# Patient Record
Sex: FEMALE | Race: WHITE | NOT HISPANIC OR LATINO | Employment: FULL TIME | URBAN - METROPOLITAN AREA
[De-identification: names, ages, dates, MRNs, and addresses within clinical notes are randomized per-mention and may not be internally consistent; named-entity substitution may affect disease eponyms.]

---

## 2017-02-21 ENCOUNTER — APPOINTMENT (OUTPATIENT)
Dept: INTERNAL MEDICINE | Facility: MEDICAL CENTER | Age: 52
End: 2017-02-21
Payer: COMMERCIAL

## 2017-03-01 ENCOUNTER — OFFICE VISIT (OUTPATIENT)
Dept: INTERNAL MEDICINE | Facility: MEDICAL CENTER | Age: 52
End: 2017-03-01
Payer: COMMERCIAL

## 2017-03-01 VITALS
HEIGHT: 66 IN | DIASTOLIC BLOOD PRESSURE: 80 MMHG | WEIGHT: 157.6 LBS | TEMPERATURE: 98.4 F | HEART RATE: 95 BPM | OXYGEN SATURATION: 94 % | SYSTOLIC BLOOD PRESSURE: 115 MMHG | BODY MASS INDEX: 25.33 KG/M2

## 2017-03-01 DIAGNOSIS — B00.1 COLD SORE: ICD-10-CM

## 2017-03-01 DIAGNOSIS — E05.90 HYPERTHYROIDISM: ICD-10-CM

## 2017-03-01 DIAGNOSIS — I87.2 CHRONIC VENOUS INSUFFICIENCY: ICD-10-CM

## 2017-03-01 DIAGNOSIS — Z12.39 SCREENING BREAST EXAMINATION: ICD-10-CM

## 2017-03-01 DIAGNOSIS — E55.9 VITAMIN D DEFICIENCY: ICD-10-CM

## 2017-03-01 DIAGNOSIS — H01.116: ICD-10-CM

## 2017-03-01 DIAGNOSIS — E78.5 DYSLIPIDEMIA: ICD-10-CM

## 2017-03-01 PROCEDURE — 99396 PREV VISIT EST AGE 40-64: CPT | Performed by: INTERNAL MEDICINE

## 2017-03-01 RX ORDER — VALACYCLOVIR HYDROCHLORIDE 1 G/1
1000 TABLET, FILM COATED ORAL 3 TIMES DAILY
Qty: 36 TAB | Refills: 2 | Status: SHIPPED | OUTPATIENT
Start: 2017-03-01 | End: 2018-02-14 | Stop reason: SDUPTHER

## 2017-03-01 ASSESSMENT — PATIENT HEALTH QUESTIONNAIRE - PHQ9: CLINICAL INTERPRETATION OF PHQ2 SCORE: 0

## 2017-03-01 NOTE — MR AVS SNAPSHOT
"        Lacey Hamlin   3/1/2017 3:20 PM   Office Visit   MRN: [id number]    Department:  [facility] Med - Internal Med   Dept Phone:  [phone]    Description:  Female : 1965   Provider:  Florentin Hebert M.D.           Reason for Visit     Annual Exam physical exam    Medication Refill valtrex      Allergies as of 3/1/2017     No Known Allergies      You were diagnosed with     Vitamin D deficiency   [9515968]       Hyperthyroidism   [768680]       Dyslipidemia   [664223]       Chronic venous insufficiency   [194057]       Screening breast examination   [963864]         Vital Signs     Blood Pressure Pulse Temperature Height Weight Body Mass Index    115/80 mmHg 95 36.9 °C (98.4 °F) 1.664 m (5' 5.51\") 71.487 kg (157 lb 9.6 oz) 25.82 kg/m2    Oxygen Saturation Smoking Status                94% Former Smoker          Basic Information     Date Of Birth Sex Race Ethnicity Preferred Language    1965 Female White Non- English      Your appointments     Sep 07, 2017 10:00 AM   Established Patient with Florentin Hebert M.D.   [facility] Med - Internal Medicine (--)    [address] [place] [address] [id number] [phone]           You will be receiving a confirmation call a few days before your appointment from our automated call confirmation system.              Problem List              ICD-10-CM Priority Class Noted - Resolved    Cold J00   2016 - Present    Chronic venous insufficiency I87.2   2016 - Present    Dyslipidemia E78.5   2016 - Present    Vitamin D deficiency E55.9   2016 - Present    Hot flashes R23.2   2016 - Present    Cold sore B00.1   2016 - Present    Onychomycosis B35.1   2016 - Present    Menopausal vasomotor syndrome N95.1   2016 - Present    Hyperthyroidism E05.90   2016 - Present    Right thyroid nodule E04.1   2016 - Present      Health Maintenance        Date Due Completion Dates   " IMM DTaP/Tdap/Td Vaccine (1 - Tdap) 3/4/1984 ---    PAP SMEAR 3/4/1986 ---    MAMMOGRAM 3/4/2005 ---    COLONOSCOPY 3/4/2015 ---    IMM INFLUENZA (1) 9/1/2016 ---            Current Immunizations     No immunizations on file.      Below and/or attached are the medications your provider expects you to take. Review all of your home medications and newly ordered medications with your provider and/or pharmacist. Follow medication instructions as directed by your provider and/or pharmacist. Please keep your medication list with you and share with your provider. Update the information when medications are discontinued, doses are changed, or new medications (including over-the-counter products) are added; and carry medication information at all times in the event of emergency situations     Allergies:  No Known Allergies          Medications  Valid as of: March 01, 2017 -  4:08 PM    Generic Name Brand Name Tablet Size Instructions for use    Conj Estrog-Medroxyprogest Ace (Tab) PREMPRO 0.45-1.5 MG Take 1 Tab by mouth every day.        ValACYclovir HCl (Tab) VALTREX 1 GM Take 1 Tab by mouth 3 times a day.        Venlafaxine HCl (Tab) EFFEXOR 37.5 MG Take 1 Tab by mouth 1 time daily as needed.        .                 Medicines prescribed today were sent to:     [facility] #[id number] - [place] - [address] [address]    Phone: [phone] Fax: [phone]    Open 24 Hours?: No      Medication refill instructions:       If your prescription bottle indicates you have medication refills left, it is not necessary to call your provider’s office. Please contact your pharmacy and they will refill your medication.    If your prescription bottle indicates you do not have any refills left, you may request refills at any time through one of the following ways: The online avocadostore system (except Urgent Care), by calling your provider’s office, or by asking your pharmacy to contact your provider’s office with a  refill request. Medication refills are processed only during regular business hours and may not be available until the next business day. Your provider may request additional information or to have a follow-up visit with you prior to refilling your medication.   *Please Note: Medication refills are assigned a new Rx number when refilled electronically. Your pharmacy may indicate that no refills were authorized even though a new prescription for the same medication is available at the pharmacy. Please request the medicine by name with the pharmacy before contacting your provider for a refill.        Your To Do List     Future Labs/Procedures Complete By Expires    TSH WITH REFLEX TO FT4  As directed 3/1/2018    VITAMIN D,25 HYDROXY  As directed 3/2/2018      Instructions    Get labs drawn  Stop calcium          MyChart Access Code: Activation code not generated  Current [employer] Status: Active

## 2017-03-02 NOTE — PROGRESS NOTES
Established Patient    Ms. Don Mosquera a 51 y.o. female who presents today with:  CC: Annual Exam and Medication Refill        Assessment and Plan    1. Cold sore  I suggested that she increase her Valtrex from 2 pills a day to 3 pills a day. Currently she only has 4 outbreaks per year and she is happy with taking when necessary Valtrex which is appropriate. I did inform her that there currently is a vaccine being  researched for herpes simplex one infections and that may be available in the near future    2. Vitamin D deficiency  She can continue with vitamin D supplementation although she has a low risk for osteoporosis. I will check a vitamin D level. I suggest that she discontinue calcium supplementation at this point due to the increased cardiovascular risk in women who take this for supplementation purposes only  - VITAMIN D,25 HYDROXY; Future    3. Hyperthyroidism  She currently does not have signs or symptoms of hypothyroidism or hyperthyroidism. Her most recent TSH was normal I will check it again.   - TSH WITH REFLEX TO FT4; Future    4. Dyslipidemia  I will repeat her lipid panel. I encouraged her to continue with her healthy lifestyle habits to decrease her overall risk for diabetes, hypertension and cardiovascular disease  - LIPID PANEL    5. Chronic venous insufficiency  Continue with compression hoses as directed. Currently they are working very well to manage her venous insufficiency    6. Screening breast examination  I will order a mammogram  - MA-SCREEN MAMMO W/CAD-BILAT    7. Allergic contact dermatitis of eyelid of left eye  I suspect that she may have cobalt allergies in addition to other substances. I suggest that she be referred to an allergist at this point and she agrees      Current Outpatient Prescriptions   Medication Sig Dispense Refill   • valacyclovir (VALTREX) 1 GM Tab Take 1 Tab by mouth 3 times a day. 36 Tab 2   • estrogen, conjugated,-medroxyprogesterone (PREMPRO) 0.45-1.5 MG  per tablet Take 1 Tab by mouth every day.     • venlafaxine (EFFEXOR) 37.5 MG Tab Take 1 Tab by mouth 1 time daily as needed. 90 Tab 3     No current facility-administered medications for this visit.         followup Return in about 6 months (around 9/1/2017).      _______________________________________________________    HPI: She is here for annual exam. Her only complaint is a cold sore.  1. Cold sore  She has a history of cold sores for many years. The location is always at the right corner mouth. Prescribed Valtrex which she takes twice a day. This works well for her normally. She is asking about the Zostrix vaccination. She has an outbreak about 4 times a year. With Valtrex the outbreak is about one week. She currently has a cold sore in the usual location and it is improving over time.    2. Vitamin D deficiency  She currently takes vitamin D supplementation over-the-counter. She is not sure of the dose. She is also supplementing with calcium. She has no history of fractures, osteoporosis, smoking, alcohol use or family history of osteoporosis.    3. Hyperthyroidism  Her previous primary care physician diagnosed her with hyperthyroidism. However repeat thyroid studies were within normal limits. She currently denies any symptoms of tremors, palpitations, sweats, diarrhea, proximal muscle weakness, hair loss, leg swelling, constipation, loss of eyebrows. She currently denies any neck pain or goiter    4. Dyslipidemia  She has a remote history of dyslipidemia. Using her lipid values from 2015 her ASCVD was 0.7%. She has not gained but 4 pounds in 2 years. She works out every day. She eats fish at least twice a week. She eats a significant amount of vegetables every day.    5. Chronic venous insufficiency  She has mild venous insufficiency and daily wears compression hoses. She denies any ulcerations, drainage or leg fatigue or pain    6. Screening breast examination  She has a gynecologist Dr. Katz who  performs her breast exams and Pap smears. She had a Pap smear in December that was negative. She would like to have a mammogram for screening    7. Allergic contact dermatitis of eyelid of left eye  At the last visit in 2016 I referred her to dermatology for recurrent dry erythematous pruritic rash involving the bilateral upper eyelids. She suspects that this is allergic in nature she has a similar rash when her belt buckle touches the skin on the abdomen or if she wears makeup or jewelry. She has daily or weekly problems with the eyelid rash. She does not have dry skin along the nasolabial fold, dandruff or dry skin behind the ears. She notices that whenever she touches her eyelid this is the stimulus that causes the rash. I referred her to dermatology and she had an appointment in December however she was in [place] and did not make the appointment       has no past medical history on file.     reports that she has quit smoking. She has never used smokeless tobacco. She reports that she drinks alcohol. She reports that she does not use illicit drugs.      ROS: As per HPI. Additional pertinent symptoms as noted below:  Constitutional ROS: No unexpected change in weight, No weakness, No fatigue, No unexplained fevers, sweats, or chills  Eye ROS: No recent significant change in vision, No eye pain, redness, discharge  Ear ROS: No ear pain, No drainage, No tinnitus or vertigo, No recent change in hearing  Mouth/Throat ROS: No teeth or gum problems, No bleeding gums, No sore throat  Neck ROS: No lumps or masses, No swollen glands, No recent swelling in thyroid area, No significant pain in neck  Pulmonary ROS: No chronic cough, sputum, or hemoptysis, No dyspnea on exertion, No wheezing, No shortness of breath  Cardiovascular ROS: No exercise intolerance, No chest pain, No shortness of breath, No dyspnea on exertion, No diaphoresis, No palpitations, No syncope  Gastrointestinal ROS: No abdominal pain, No change in bowel  "habits, No significant change in appetite, No nausea, vomiting, diarrhea, or constipation, No regurgitation, heartburn, foul taste in mouth, or frequent belching  Musculoskeletal/Extremities ROS: No clubbing, No peripheral edema, No pain, redness or swelling on the joints  Hematologic/Lymphatic ROS: No anemia, No night sweats, No weight loss  Skin/Integumentary ROS: color, texture and temperature normal, mobility and turgor normal  Neurologic ROS: No seizures, No weakness  Psychiatric ROS: No depression, No anxiety      Physical Exam  /80 mmHg  Pulse 95  Temp(Src) 36.9 °C (98.4 °F)  Ht 1.664 m (5' 5.51\")  Wt 71.487 kg (157 lb 9.6 oz)  BMI 25.82 kg/m2  SpO2 94%  Constitutional:  oriented to person, place, and time. No distress.   Eyes: Pupils are equal, round, and reactive to light. No scleral icterus.   Neck: Neck supple. No thyromegaly present.   Cardiovascular: Normal rate, regular rhythm and normal heart sounds.  Exam reveals no gallop and no friction rub.    No murmur heard.  Pulmonary/Chest: Breath sounds normal. Chest wall is not dull to percussion.   Musculoskeletal:   no edema.   Lymphadenopathy: no cervical adenopathy  Neurological: alert and oriented to person, place, and time.   Skin: No cyanosis. Nails show no clubbing.          Current Outpatient Prescriptions on File Prior to Visit   Medication Sig Dispense Refill   • estrogen, conjugated,-medroxyprogesterone (PREMPRO) 0.45-1.5 MG per tablet Take 1 Tab by mouth every day.     • venlafaxine (EFFEXOR) 37.5 MG Tab Take 1 Tab by mouth 1 time daily as needed. 90 Tab 3     No current facility-administered medications on file prior to visit.           Signed by: Florentin Hebert M.D.  "

## 2017-03-03 LAB
25(OH)D3+25(OH)D2 SERPL-MCNC: 40.2 NG/ML (ref 30–100)
CHOLEST SERPL-MCNC: 158 MG/DL (ref 100–199)
COMMENT 011824: NORMAL
HDLC SERPL-MCNC: 74 MG/DL
LDLC SERPL CALC-MCNC: 73 MG/DL (ref 0–99)
TRIGL SERPL-MCNC: 56 MG/DL (ref 0–149)
VLDLC SERPL CALC-MCNC: 11 MG/DL (ref 5–40)

## 2017-04-07 ENCOUNTER — HOSPITAL ENCOUNTER (OUTPATIENT)
Dept: RADIOLOGY | Facility: MEDICAL CENTER | Age: 52
End: 2017-04-07

## 2017-04-11 ENCOUNTER — HOSPITAL ENCOUNTER (OUTPATIENT)
Dept: RADIOLOGY | Facility: MEDICAL CENTER | Age: 52
End: 2017-04-11
Attending: INTERNAL MEDICINE
Payer: COMMERCIAL

## 2017-04-11 PROCEDURE — 77063 BREAST TOMOSYNTHESIS BI: CPT

## 2017-05-31 ENCOUNTER — APPOINTMENT (OUTPATIENT)
Dept: PLASTIC SURGERY | Facility: IMAGING CENTER | Age: 52
End: 2017-05-31

## 2017-09-07 ENCOUNTER — APPOINTMENT (OUTPATIENT)
Dept: INTERNAL MEDICINE | Facility: MEDICAL CENTER | Age: 52
End: 2017-09-07
Payer: COMMERCIAL

## 2017-10-04 ENCOUNTER — APPOINTMENT (OUTPATIENT)
Dept: DERMATOLOGY | Facility: IMAGING CENTER | Age: 52
End: 2017-10-04

## 2018-02-14 NOTE — TELEPHONE ENCOUNTER
Last seen: 03/01/17 by Dr. Hebert  Next appt: 04/25/18 with Dr. Hebert    Was the patient seen in the last year in this department? Yes   Does patient have an active prescription for medications requested? No   Received Request Via: Pharmacy

## 2018-02-15 RX ORDER — VALACYCLOVIR HYDROCHLORIDE 1 G/1
1000 TABLET, FILM COATED ORAL 3 TIMES DAILY
Qty: 36 TAB | Refills: 1 | Status: SHIPPED | OUTPATIENT
Start: 2018-02-15 | End: 2018-08-28 | Stop reason: SDUPTHER

## 2018-03-22 ENCOUNTER — HOSPITAL ENCOUNTER (OUTPATIENT)
Facility: MEDICAL CENTER | Age: 53
End: 2018-03-22
Attending: SPECIALIST
Payer: COMMERCIAL

## 2018-03-22 PROCEDURE — 87624 HPV HI-RISK TYP POOLED RSLT: CPT

## 2018-03-22 PROCEDURE — 88175 CYTOPATH C/V AUTO FLUID REDO: CPT

## 2018-03-25 LAB
CYTOLOGY REG CYTOL: NORMAL
HPV HR 12 DNA CVX QL NAA+PROBE: NEGATIVE
HPV16 DNA SPEC QL NAA+PROBE: NEGATIVE
HPV18 DNA SPEC QL NAA+PROBE: NEGATIVE
SPECIMEN SOURCE: NORMAL

## 2018-04-25 ENCOUNTER — OFFICE VISIT (OUTPATIENT)
Dept: INTERNAL MEDICINE | Facility: MEDICAL CENTER | Age: 53
End: 2018-04-25
Payer: COMMERCIAL

## 2018-04-25 VITALS
HEART RATE: 55 BPM | HEIGHT: 65 IN | TEMPERATURE: 97.4 F | DIASTOLIC BLOOD PRESSURE: 80 MMHG | OXYGEN SATURATION: 95 % | WEIGHT: 156.8 LBS | BODY MASS INDEX: 26.12 KG/M2 | SYSTOLIC BLOOD PRESSURE: 120 MMHG

## 2018-04-25 DIAGNOSIS — E05.90 HYPERTHYROIDISM: ICD-10-CM

## 2018-04-25 DIAGNOSIS — R23.2 HOT FLASHES: ICD-10-CM

## 2018-04-25 DIAGNOSIS — Z12.39 SCREENING FOR BREAST CANCER: ICD-10-CM

## 2018-04-25 DIAGNOSIS — Z23 NEED FOR VACCINATION: ICD-10-CM

## 2018-04-25 DIAGNOSIS — I87.2 CHRONIC VENOUS INSUFFICIENCY: ICD-10-CM

## 2018-04-25 DIAGNOSIS — E78.5 DYSLIPIDEMIA: ICD-10-CM

## 2018-04-25 DIAGNOSIS — Z12.11 SCREENING FOR COLON CANCER: ICD-10-CM

## 2018-04-25 DIAGNOSIS — N95.1 MENOPAUSAL VASOMOTOR SYNDROME: ICD-10-CM

## 2018-04-25 DIAGNOSIS — E55.9 VITAMIN D DEFICIENCY: ICD-10-CM

## 2018-04-25 PROCEDURE — 90471 IMMUNIZATION ADMIN: CPT | Performed by: INTERNAL MEDICINE

## 2018-04-25 PROCEDURE — 99214 OFFICE O/P EST MOD 30 MIN: CPT | Mod: 25 | Performed by: INTERNAL MEDICINE

## 2018-04-25 PROCEDURE — 90715 TDAP VACCINE 7 YRS/> IM: CPT | Performed by: INTERNAL MEDICINE

## 2018-04-25 ASSESSMENT — PATIENT HEALTH QUESTIONNAIRE - PHQ9: CLINICAL INTERPRETATION OF PHQ2 SCORE: 0

## 2018-04-25 NOTE — PROGRESS NOTES
Established Patient    Ms. Montano a 53 y.o. female who presents today with:  CC: Follow-Up (3 month f/u)        Assessment and Plan    1. Chronic venous insufficiency  Stable. Currently no evidence of venous insufficiency. Continue compression socks with work.    2. Dyslipidemia  Blood pressure is well controlled. Last ASCVD was less than 1%. Repeat lipid panel. Continue daily exercise.    3. Hot flashes  Resolved. Although patient is postmenopausal currently has no symptoms. She discontinued venlafaxine    4. Hyperthyroidism  Previous history of abnormal TSH. She currently does not have symptoms of hyperthyroidism. Thyroid exam is normal. Will repeat her TSH.    5. Vitamin D deficiency  Most recent vitamin D level is 40. Continue with supplementation with vitamin D.  Preventative-order mammogram, fecal occult. Patient will receive tetanus today. She will consider shingles vaccination.    Current Outpatient Prescriptions   Medication Sig Dispense Refill   • valacyclovir (VALTREX) 1 GM Tab Take 1 Tab by mouth 3 times a day. 36 Tab 1   • estrogen, conjugated,-medroxyprogesterone (PREMPRO) 0.45-1.5 MG per tablet Take 1 Tab by mouth every day.     • venlafaxine (EFFEXOR) 37.5 MG Tab Take 1 Tab by mouth 1 time daily as needed. 90 Tab 3     No current facility-administered medications for this visit.          followup No Follow-up on file.    This note was created using voice recognition software (Dragon). The accuracy of the dictation is limited by the abilities of the software. I have reviewed the note prior to signing, however some errors in grammar and context are still possible. If you have any questions related to this note please do not hesitate to contact our office.   _______________________________________________________    HPI: No issues currently  1. Chronic venous insufficiency  History of venous insufficiency. Legs are not swelling. She wears compression socks daily. No ulcers. Legs feel tired. No  "color changes    2. Dyslipidemia  Prior ASCVD was 0.8%. No tobacco use no obesity no hypertension or diabetes. Exercises daily. Mostly aerobic.     3. Hot flashes  No longer has symptoms of hot flashes. No sweating, insomnia, dry skin, vaginal dryness. No longer taking effexor for vasomotor symptoms. Does not need it.     4. Hyperthyroidism  Previous TSH was suppressed. But no official diagnosis of hyperthyroidism. Denies any weight loss, diaphoresis, palpitations, diarrhea, fatigue.    5. Vitamin D deficiency  Still taking vitamin 1000 U daily. No smoking. Walkings.  No family history of osteoporosis. Drinks 2-3 glasses of wine per week. Post menopausal. Last vitamin D 40.2 in 2017.    She has her pap smear one month ago and was normal. Seeing Katz.  Per patient had colonoscopy 3 years ago and was negative. Last tetanus vaccinatio. Is unknown  n has no past medical history on file.     reports that she has quit smoking. She has a 20.00 pack-year smoking history. She has never used smokeless tobacco. She reports that she drinks alcohol. She reports that she does not use drugs.      ROS: Pertinent positives as stated in HPI, all others reviewed as negative:  Psych: No depression.      Physical Exam  /80   Pulse (!) 55   Temp 36.3 °C (97.4 °F)   Ht 1.651 m (5' 5\")   Wt 71.1 kg (156 lb 12.8 oz)   SpO2 95%   BMI 26.09 kg/m²   Constitutional:  oriented to person, place, and time. No distress.   Eyes: Pupils are equal, round, and reactive to light. No scleral icterus.   Neck: Neck supple. No thyromegaly present.   Cardiovascular: Normal rate, regular rhythm and normal heart sounds.  Exam reveals no gallop and no friction rub.    No murmur heard.  Pulmonary/Chest: Breath sounds normal. Chest wall is not dull to percussion.   Musculoskeletal:   no edema.   Lymphadenopathy: no cervical adenopathy  Neurological: alert and oriented to person, place, and time.   Skin: No cyanosis. Nails show no " clubbing.          Current Outpatient Prescriptions on File Prior to Visit   Medication Sig Dispense Refill   • valacyclovir (VALTREX) 1 GM Tab Take 1 Tab by mouth 3 times a day. 36 Tab 1   • estrogen, conjugated,-medroxyprogesterone (PREMPRO) 0.45-1.5 MG per tablet Take 1 Tab by mouth every day.     • venlafaxine (EFFEXOR) 37.5 MG Tab Take 1 Tab by mouth 1 time daily as needed. 90 Tab 3     No current facility-administered medications on file prior to visit.            Signed by: Florentin Hebert M.D.

## 2018-04-25 NOTE — NON-PROVIDER
"Lacey Perez is a 53 y.o. female here for a non-provider visit for:   TDAP    Reason for immunization: Overdue/Provider Recommended  Immunization records indicate need for vaccine: Yes, confirmed with Epic  Minimum interval has been met for this vaccine: Yes  ABN completed: Not Indicated    Order and dose verified by: NICHOLE  VIS Dated  02/24/15 was given to patient: Yes  All IAC Questionnaire questions were answered \"No.\"    Patient tolerated injection and no adverse effects were observed or reported: Yes    Pt scheduled for next dose in series: Not Indicated    "

## 2018-05-09 ENCOUNTER — HOSPITAL ENCOUNTER (OUTPATIENT)
Dept: RADIOLOGY | Facility: MEDICAL CENTER | Age: 53
End: 2018-05-09

## 2018-05-10 ENCOUNTER — HOSPITAL ENCOUNTER (OUTPATIENT)
Dept: RADIOLOGY | Facility: MEDICAL CENTER | Age: 53
End: 2018-05-10
Attending: INTERNAL MEDICINE
Payer: COMMERCIAL

## 2018-05-10 DIAGNOSIS — Z12.39 SCREENING FOR BREAST CANCER: ICD-10-CM

## 2018-05-10 PROCEDURE — 77067 SCR MAMMO BI INCL CAD: CPT

## 2018-05-11 ENCOUNTER — HOSPITAL ENCOUNTER (OUTPATIENT)
Dept: LAB | Facility: MEDICAL CENTER | Age: 53
End: 2018-05-11
Attending: INTERNAL MEDICINE
Payer: COMMERCIAL

## 2018-05-11 DIAGNOSIS — E55.9 VITAMIN D DEFICIENCY: ICD-10-CM

## 2018-05-11 DIAGNOSIS — E05.90 HYPERTHYROIDISM: ICD-10-CM

## 2018-05-11 DIAGNOSIS — R23.2 HOT FLASHES: ICD-10-CM

## 2018-05-11 LAB
25(OH)D3 SERPL-MCNC: 39 NG/ML (ref 30–100)
ALBUMIN SERPL BCP-MCNC: 4.1 G/DL (ref 3.2–4.9)
ALBUMIN/GLOB SERPL: 1.6 G/DL
ALP SERPL-CCNC: 39 U/L (ref 30–99)
ALT SERPL-CCNC: 8 U/L (ref 2–50)
ANION GAP SERPL CALC-SCNC: 4 MMOL/L (ref 0–11.9)
AST SERPL-CCNC: 17 U/L (ref 12–45)
BASOPHILS # BLD AUTO: 1.2 % (ref 0–1.8)
BASOPHILS # BLD: 0.05 K/UL (ref 0–0.12)
BILIRUB SERPL-MCNC: 0.8 MG/DL (ref 0.1–1.5)
BUN SERPL-MCNC: 11 MG/DL (ref 8–22)
CALCIUM SERPL-MCNC: 9 MG/DL (ref 8.5–10.5)
CHLORIDE SERPL-SCNC: 105 MMOL/L (ref 96–112)
CHOLEST SERPL-MCNC: 180 MG/DL (ref 100–199)
CO2 SERPL-SCNC: 29 MMOL/L (ref 20–33)
CREAT SERPL-MCNC: 0.69 MG/DL (ref 0.5–1.4)
EOSINOPHIL # BLD AUTO: 0.04 K/UL (ref 0–0.51)
EOSINOPHIL NFR BLD: 0.9 % (ref 0–6.9)
ERYTHROCYTE [DISTWIDTH] IN BLOOD BY AUTOMATED COUNT: 46.2 FL (ref 35.9–50)
GLOBULIN SER CALC-MCNC: 2.5 G/DL (ref 1.9–3.5)
GLUCOSE SERPL-MCNC: 77 MG/DL (ref 65–99)
HCT VFR BLD AUTO: 40.6 % (ref 37–47)
HDLC SERPL-MCNC: 84 MG/DL
HGB BLD-MCNC: 13.4 G/DL (ref 12–16)
IMM GRANULOCYTES # BLD AUTO: 0.01 K/UL (ref 0–0.11)
IMM GRANULOCYTES NFR BLD AUTO: 0.2 % (ref 0–0.9)
LDLC SERPL CALC-MCNC: 88 MG/DL
LYMPHOCYTES # BLD AUTO: 2.31 K/UL (ref 1–4.8)
LYMPHOCYTES NFR BLD: 53.3 % (ref 22–41)
MCH RBC QN AUTO: 31.8 PG (ref 27–33)
MCHC RBC AUTO-ENTMCNC: 33 G/DL (ref 33.6–35)
MCV RBC AUTO: 96.4 FL (ref 81.4–97.8)
MONOCYTES # BLD AUTO: 0.24 K/UL (ref 0–0.85)
MONOCYTES NFR BLD AUTO: 5.5 % (ref 0–13.4)
NEUTROPHILS # BLD AUTO: 1.68 K/UL (ref 2–7.15)
NEUTROPHILS NFR BLD: 38.9 % (ref 44–72)
NRBC # BLD AUTO: 0 K/UL
NRBC BLD-RTO: 0 /100 WBC
PLATELET # BLD AUTO: 207 K/UL (ref 164–446)
PMV BLD AUTO: 9.8 FL (ref 9–12.9)
POTASSIUM SERPL-SCNC: 4.3 MMOL/L (ref 3.6–5.5)
PROT SERPL-MCNC: 6.6 G/DL (ref 6–8.2)
RBC # BLD AUTO: 4.21 M/UL (ref 4.2–5.4)
SODIUM SERPL-SCNC: 138 MMOL/L (ref 135–145)
TRIGL SERPL-MCNC: 38 MG/DL (ref 0–149)
TSH SERPL DL<=0.005 MIU/L-ACNC: 1.04 UIU/ML (ref 0.38–5.33)
WBC # BLD AUTO: 4.3 K/UL (ref 4.8–10.8)

## 2018-05-11 PROCEDURE — 82306 VITAMIN D 25 HYDROXY: CPT

## 2018-05-11 PROCEDURE — 85025 COMPLETE CBC W/AUTO DIFF WBC: CPT

## 2018-05-11 PROCEDURE — 84443 ASSAY THYROID STIM HORMONE: CPT

## 2018-05-11 PROCEDURE — 80061 LIPID PANEL: CPT

## 2018-05-11 PROCEDURE — 36415 COLL VENOUS BLD VENIPUNCTURE: CPT

## 2018-05-11 PROCEDURE — 80053 COMPREHEN METABOLIC PANEL: CPT

## 2018-05-14 DIAGNOSIS — D70.9 NEUTROPENIA, UNSPECIFIED TYPE (HCC): ICD-10-CM

## 2019-03-04 NOTE — TELEPHONE ENCOUNTER
Last seen: 04/25/18 by Dr. Hebert  Next appt: 04/23/19 with Dr. Hebert    Was the patient seen in the last year in this department? Yes   Does patient have an active prescription for medications requested? No   Received Request Via: Pharmacy

## 2019-03-05 RX ORDER — VALACYCLOVIR HYDROCHLORIDE 1 G/1
1000 TABLET, FILM COATED ORAL 3 TIMES DAILY
Qty: 36 TAB | Refills: 1 | Status: SHIPPED | OUTPATIENT
Start: 2019-03-05 | End: 2019-06-12 | Stop reason: SDUPTHER

## 2019-04-01 ENCOUNTER — HOSPITAL ENCOUNTER (OUTPATIENT)
Dept: LAB | Facility: MEDICAL CENTER | Age: 54
End: 2019-04-01
Attending: SPECIALIST
Payer: COMMERCIAL

## 2019-04-01 PROCEDURE — 87624 HPV HI-RISK TYP POOLED RSLT: CPT

## 2019-04-01 PROCEDURE — 88175 CYTOPATH C/V AUTO FLUID REDO: CPT

## 2019-04-23 ENCOUNTER — OFFICE VISIT (OUTPATIENT)
Dept: INTERNAL MEDICINE | Facility: MEDICAL CENTER | Age: 54
End: 2019-04-23
Payer: COMMERCIAL

## 2019-04-23 ENCOUNTER — HOSPITAL ENCOUNTER (OUTPATIENT)
Dept: RADIOLOGY | Facility: MEDICAL CENTER | Age: 54
End: 2019-04-23
Attending: INTERNAL MEDICINE
Payer: COMMERCIAL

## 2019-04-23 VITALS
SYSTOLIC BLOOD PRESSURE: 106 MMHG | OXYGEN SATURATION: 96 % | HEIGHT: 65 IN | WEIGHT: 156.6 LBS | BODY MASS INDEX: 26.09 KG/M2 | DIASTOLIC BLOOD PRESSURE: 70 MMHG | HEART RATE: 56 BPM | TEMPERATURE: 97.7 F

## 2019-04-23 DIAGNOSIS — R09.89 ABNORMAL LUNG SOUNDS: ICD-10-CM

## 2019-04-23 DIAGNOSIS — E78.5 DYSLIPIDEMIA: ICD-10-CM

## 2019-04-23 DIAGNOSIS — E05.90 HYPERTHYROIDISM: ICD-10-CM

## 2019-04-23 DIAGNOSIS — E55.9 VITAMIN D DEFICIENCY: ICD-10-CM

## 2019-04-23 DIAGNOSIS — I83.891 VARICOSE VEINS OF RIGHT LOWER EXTREMITY WITH OTHER COMPLICATIONS: ICD-10-CM

## 2019-04-23 PROCEDURE — 71046 X-RAY EXAM CHEST 2 VIEWS: CPT

## 2019-04-23 PROCEDURE — 99396 PREV VISIT EST AGE 40-64: CPT | Performed by: INTERNAL MEDICINE

## 2019-04-23 ASSESSMENT — PATIENT HEALTH QUESTIONNAIRE - PHQ9: CLINICAL INTERPRETATION OF PHQ2 SCORE: 0

## 2019-04-23 NOTE — PROGRESS NOTES
Established Patient    Ms. Montano a 54 y.o. female who presents today with:  CC: Annual Exam (physical)        Assessment and Plan    1.  Varicose vein right lower extremity-patient requesting referral to vascular surgery.  I will refer her.  2.  Abnormal lung sounds-she has slightly decreased lung sounds in the right base which I think might be secondary to more muscle in her right lower back.  She has no respiratory complaints.  I will perform a chest x-ray to rule out effusion.  3.  Hypothyroidism-currently asymptomatic.  Repeat TSH.  4.  Vitamin D deficiency.-Continue with vitamin D supplementation.    Current Outpatient Prescriptions   Medication Sig Dispense Refill   • estrogen, conjugated,-medroxyprogesterone (PREMPRO) 0.45-1.5 MG per tablet Take 1 Tab by mouth every day.     • valacyclovir (VALTREX) 1 GM Tab TAKE 1 TAB BY MOUTH 3 TIMES A DAY. 36 Tab 1   • valacyclovir (VALTREX) 1 GM Tab TAKE 1 TAB BY MOUTH 3 TIMES A DAY. 36 Tab 1     No current facility-administered medications for this visit.          followup No Follow-up on file.    This note was created using voice recognition software (Dragon). The accuracy of the dictation is limited by the abilities of the software. I have reviewed the note prior to signing, however some errors in grammar and context are still possible. If you have any questions related to this note please do not hesitate to contact our office.   _______________________________________________________    HPI:   Here for preventive therapy. Wants to talk about her vein on her right lower leg. Causes no  pain.  but legs feel tired at the end of the day. No swelling of the lower extremities but wears compression hoses daily. Wants to see vascular surgery  Diet is good. Alcohol 5 days per week- wine 1-2 glasses. 2-3 servings of  vegetables. 2-3 servings of  fish/turkey. Not very often red meat. Very seldom white flour. Minimal processed food.  Exercise  one time a week. Busy with  "[place] so it is difficult. Stress is okay. Sleep is great. No depression or decreased mood.  She had a colonoscopy when she was 50. No polyps. Had mammogram in 2018 negative. Pap smear by gynecology  Dr. Katz does her pap smears and no abnormalities       has no past medical history on file.     reports that she has quit smoking. She has a 20.00 pack-year smoking history. She has never used smokeless tobacco. She reports that she drinks alcohol. She reports that she does not use drugs.      ROS: Pertinent positives as stated in HPI, all others reviewed as negative:  Constitutional ROS: No unexpected change in weight, No weakness, No fatigue, No unexplained fevers, sweats, or chills  Eye ROS: No recent significant change in vision, No eye pain, redness, discharge  Ear ROS: No ear pain, No drainage, No recent change in hearing  Mouth/Throat ROS: No sore throat, No recent change in voice or hoarseness  Neck ROS: No swollen glands, No recent swelling in thyroid area, No significant pain in neck  Pulmonary ROS: No chronic cough, sputum, or hemoptysis, No dyspnea on exertion, No wheezing, No shortness of breath  Cardiovascular ROS: No exercise intolerance, No chest pain, No shortness of breath, No edema, No palpitations  Gastrointestinal ROS: No abdominal pain, No significant change in appetite, No nausea, vomiting, diarrhea, or constipation, No abdominal bloating or early satiety  Musculoskeletal/Extremities ROS: No pain, redness or swelling on the joints  Hematologic/Lymphatic ROS: No bruising  Skin/Integumentary ROS: No abnormal skin lesions noted, No evidence of rash  Neurologic ROS: No chronic headaches, No seizures  Psychiatric ROS: No depression, No anxiety      Physical Exam  /70 (BP Location: Right arm, Patient Position: Sitting, BP Cuff Size: Adult)   Pulse (!) 56   Temp 36.5 °C (97.7 °F) (Temporal)   Ht 1.651 m (5' 5\")   Wt 71 kg (156 lb 9.6 oz)   SpO2 96%   BMI 26.06 kg/m²   Constitutional:  " oriented to person, place, and time. No distress.   Eyes: Pupils are equal, round, and reactive to light. No scleral icterus.   Neck: Neck supple. No thyromegaly present.   Cardiovascular: Normal rate, regular rhythm and normal heart sounds.  Exam reveals no gallop and no friction rub.    No murmur heard.  Pulmonary/Chest: Breath sounds normal. Chest wall is not dull to percussion. Decreased breath sounds slightly base right lung.  Slight increase dullness to percussion on the right negative bronchophony and egophony  Musculoskeletal:   no edema.   Lymphadenopathy: no cervical adenopathy  Neurological: alert and oriented to person, place, and time.   Skin: No cyanosis. Nails show no clubbing. Large lateral right leg reticular and varicose vein. Non painful          Current Outpatient Prescriptions on File Prior to Visit   Medication Sig Dispense Refill   • estrogen, conjugated,-medroxyprogesterone (PREMPRO) 0.45-1.5 MG per tablet Take 1 Tab by mouth every day.     • valacyclovir (VALTREX) 1 GM Tab TAKE 1 TAB BY MOUTH 3 TIMES A DAY. 36 Tab 1   • valacyclovir (VALTREX) 1 GM Tab TAKE 1 TAB BY MOUTH 3 TIMES A DAY. 36 Tab 1     No current facility-administered medications on file prior to visit.            Signed by: Florentin Hebert M.D.

## 2019-04-23 NOTE — PATIENT INSTRUCTIONS
Consider Shingrix vaccine for shingles prevention at local [facility]  Get labs done  I will refer you to vascular surgery

## 2019-04-30 ENCOUNTER — HOSPITAL ENCOUNTER (OUTPATIENT)
Dept: LAB | Facility: MEDICAL CENTER | Age: 54
End: 2019-04-30
Attending: INTERNAL MEDICINE
Payer: COMMERCIAL

## 2019-04-30 DIAGNOSIS — E05.90 HYPERTHYROIDISM: ICD-10-CM

## 2019-04-30 DIAGNOSIS — E55.9 VITAMIN D DEFICIENCY: ICD-10-CM

## 2019-04-30 LAB
25(OH)D3 SERPL-MCNC: 33 NG/ML (ref 30–100)
TSH SERPL DL<=0.005 MIU/L-ACNC: 1.3 UIU/ML (ref 0.38–5.33)

## 2019-04-30 PROCEDURE — 84443 ASSAY THYROID STIM HORMONE: CPT

## 2019-04-30 PROCEDURE — 36415 COLL VENOUS BLD VENIPUNCTURE: CPT

## 2019-04-30 PROCEDURE — 82306 VITAMIN D 25 HYDROXY: CPT

## 2019-05-13 ENCOUNTER — HOSPITAL ENCOUNTER (OUTPATIENT)
Dept: RADIOLOGY | Facility: MEDICAL CENTER | Age: 54
End: 2019-05-13
Attending: SPECIALIST
Payer: COMMERCIAL

## 2019-05-13 DIAGNOSIS — Z12.39 SCREENING BREAST EXAMINATION: ICD-10-CM

## 2019-05-13 PROCEDURE — 77063 BREAST TOMOSYNTHESIS BI: CPT

## 2020-02-26 ENCOUNTER — HOSPITAL ENCOUNTER (OUTPATIENT)
Dept: LAB | Facility: MEDICAL CENTER | Age: 55
End: 2020-02-26
Attending: INTERNAL MEDICINE
Payer: COMMERCIAL

## 2020-02-26 LAB
ALBUMIN SERPL BCP-MCNC: 4.4 G/DL (ref 3.2–4.9)
ALBUMIN/GLOB SERPL: 1.7 G/DL
ALP SERPL-CCNC: 45 U/L (ref 30–99)
ALT SERPL-CCNC: 12 U/L (ref 2–50)
ANION GAP SERPL CALC-SCNC: 3 MMOL/L (ref 0–11.9)
AST SERPL-CCNC: 19 U/L (ref 12–45)
BILIRUB SERPL-MCNC: 0.5 MG/DL (ref 0.1–1.5)
BUN SERPL-MCNC: 10 MG/DL (ref 8–22)
CALCIUM SERPL-MCNC: 9.1 MG/DL (ref 8.5–10.5)
CHLORIDE SERPL-SCNC: 104 MMOL/L (ref 96–112)
CHOLEST SERPL-MCNC: 168 MG/DL (ref 100–199)
CO2 SERPL-SCNC: 30 MMOL/L (ref 20–33)
CREAT SERPL-MCNC: 0.78 MG/DL (ref 0.5–1.4)
GLOBULIN SER CALC-MCNC: 2.6 G/DL (ref 1.9–3.5)
GLUCOSE SERPL-MCNC: 99 MG/DL (ref 65–99)
HDLC SERPL-MCNC: 74 MG/DL
LDLC SERPL CALC-MCNC: 87 MG/DL
POTASSIUM SERPL-SCNC: 4.5 MMOL/L (ref 3.6–5.5)
PROT SERPL-MCNC: 7 G/DL (ref 6–8.2)
SODIUM SERPL-SCNC: 137 MMOL/L (ref 135–145)
TRIGL SERPL-MCNC: 34 MG/DL (ref 0–149)

## 2020-02-26 PROCEDURE — 80053 COMPREHEN METABOLIC PANEL: CPT

## 2020-02-26 PROCEDURE — 84443 ASSAY THYROID STIM HORMONE: CPT

## 2020-02-26 PROCEDURE — 82306 VITAMIN D 25 HYDROXY: CPT

## 2020-02-26 PROCEDURE — 36415 COLL VENOUS BLD VENIPUNCTURE: CPT

## 2020-02-26 PROCEDURE — 80061 LIPID PANEL: CPT

## 2020-02-27 LAB
25(OH)D3 SERPL-MCNC: 49 NG/ML (ref 30–100)
TSH SERPL DL<=0.005 MIU/L-ACNC: 0.98 UIU/ML (ref 0.38–5.33)

## 2020-05-08 ENCOUNTER — HOSPITAL ENCOUNTER (OUTPATIENT)
Dept: LAB | Facility: MEDICAL CENTER | Age: 55
End: 2020-05-08
Attending: INTERNAL MEDICINE
Payer: COMMERCIAL

## 2020-05-08 PROCEDURE — 82274 ASSAY TEST FOR BLOOD FECAL: CPT

## 2020-05-14 ENCOUNTER — HOSPITAL ENCOUNTER (OUTPATIENT)
Facility: MEDICAL CENTER | Age: 55
End: 2020-05-14
Attending: SPECIALIST
Payer: COMMERCIAL

## 2020-05-14 LAB — HEMOCCULT STL QL IA: NEGATIVE

## 2020-05-14 PROCEDURE — 87624 HPV HI-RISK TYP POOLED RSLT: CPT

## 2020-05-14 PROCEDURE — 87591 N.GONORRHOEAE DNA AMP PROB: CPT

## 2020-05-14 PROCEDURE — 88175 CYTOPATH C/V AUTO FLUID REDO: CPT

## 2020-05-14 PROCEDURE — 87491 CHLMYD TRACH DNA AMP PROBE: CPT

## 2020-05-16 LAB — AMBIGUOUS DTTM AMBI4: NORMAL

## 2020-05-19 LAB
C TRACH DNA GENITAL QL NAA+PROBE: NEGATIVE
CYTOLOGY REG CYTOL: NORMAL
HPV HR 12 DNA CVX QL NAA+PROBE: NEGATIVE
HPV16 DNA SPEC QL NAA+PROBE: NEGATIVE
HPV18 DNA SPEC QL NAA+PROBE: NEGATIVE
N GONORRHOEA DNA GENITAL QL NAA+PROBE: NEGATIVE
SPECIMEN SOURCE: NORMAL
SPECIMEN SOURCE: NORMAL

## 2020-06-16 ENCOUNTER — HOSPITAL ENCOUNTER (OUTPATIENT)
Dept: RADIOLOGY | Facility: MEDICAL CENTER | Age: 55
End: 2020-06-16
Attending: INTERNAL MEDICINE
Payer: COMMERCIAL

## 2020-06-16 DIAGNOSIS — Z12.39 BREAST SCREENING: ICD-10-CM

## 2020-06-16 PROCEDURE — 77067 SCR MAMMO BI INCL CAD: CPT | Mod: 50

## 2020-06-19 ENCOUNTER — HOSPITAL ENCOUNTER (OUTPATIENT)
Dept: RADIOLOGY | Facility: MEDICAL CENTER | Age: 55
End: 2020-06-19
Attending: INTERNAL MEDICINE
Payer: COMMERCIAL

## 2020-06-19 DIAGNOSIS — R92.8 ABNORMAL MAMMOGRAM: ICD-10-CM

## 2020-06-19 PROCEDURE — 76642 ULTRASOUND BREAST LIMITED: CPT | Mod: RT

## 2020-06-19 PROCEDURE — G0279 TOMOSYNTHESIS, MAMMO: HCPCS | Mod: RT

## 2021-03-15 DIAGNOSIS — Z23 NEED FOR VACCINATION: ICD-10-CM

## 2021-04-09 ENCOUNTER — OFFICE VISIT (OUTPATIENT)
Dept: URGENT CARE | Facility: CLINIC | Age: 56
End: 2021-04-09
Payer: COMMERCIAL

## 2021-04-09 VITALS
HEART RATE: 74 BPM | OXYGEN SATURATION: 97 % | SYSTOLIC BLOOD PRESSURE: 120 MMHG | BODY MASS INDEX: 26.09 KG/M2 | DIASTOLIC BLOOD PRESSURE: 76 MMHG | TEMPERATURE: 98.2 F | WEIGHT: 166.2 LBS | HEIGHT: 67 IN | RESPIRATION RATE: 16 BRPM

## 2021-04-09 DIAGNOSIS — S61.212A LACERATION OF RIGHT MIDDLE FINGER WITHOUT FOREIGN BODY WITHOUT DAMAGE TO NAIL, INITIAL ENCOUNTER: ICD-10-CM

## 2021-04-09 PROCEDURE — 12002 RPR S/N/AX/GEN/TRNK2.6-7.5CM: CPT | Mod: F6 | Performed by: PHYSICIAN ASSISTANT

## 2021-04-09 RX ORDER — CEPHALEXIN 500 MG/1
500 CAPSULE ORAL 4 TIMES DAILY
Qty: 28 CAPSULE | Refills: 0 | Status: SHIPPED | OUTPATIENT
Start: 2021-04-09 | End: 2021-04-16

## 2021-04-09 ASSESSMENT — ENCOUNTER SYMPTOMS
EYE DISCHARGE: 0
HEADACHES: 0
VOMITING: 0
COUGH: 0
EYE REDNESS: 0
FEVER: 0
NAUSEA: 0
SHORTNESS OF BREATH: 0
SORE THROAT: 0

## 2021-04-09 NOTE — PROGRESS NOTES
Subjective:      Lacey Ochoa is a 56 y.o. female who presents with Laceration (yesterday afternoon, right middle finger)            The patient presents to clinic secondary to a laceration to her right middle finger.  The patient states she accidentally cut right middle finger on a sharp knife.  The patient states the laceration occurred approximately 12 PM yesterday afternoon.  The patient reports slight pain surrounding the laceration.  She reports no decreased range of motion.  No numbness, tingling, or weakness.  The patient is unsure of her last tetanus vaccine.    Laceration   Incident onset: yesterday at approximately 12:00PM. The laceration is located on the right hand (middl finger of right hand). The laceration is 2 cm in size. The laceration mechanism was a clean knife. The pain is mild. She reports no foreign bodies present. Her tetanus status is unknown.     PMH:  has no past medical history on file.  MEDS:   Current Outpatient Medications:   •  valacyclovir (VALTREX) 1 GM Tab, TAKE 1 TABLET BY MOUTH THREE TIMES A DAY, Disp: 30 Tab, Rfl: 1  •  valacyclovir (VALTREX) 1 GM Tab, TAKE 1 TAB BY MOUTH 3 TIMES A DAY., Disp: 36 Tab, Rfl: 1  •  estrogen, conjugated,-medroxyprogesterone (PREMPRO) 0.45-1.5 MG per tablet, Take 1 Tab by mouth every day., Disp: , Rfl:   ALLERGIES: No Known Allergies  SURGHX: No past surgical history on file.  SOCHX:  reports that she has quit smoking. She has a 20.00 pack-year smoking history. She has never used smokeless tobacco. She reports current alcohol use. She reports that she does not use drugs.  FH: Family history was reviewed, no pertinent findings to report    Review of Systems   Constitutional: Negative for fever.   HENT: Negative for congestion, ear pain and sore throat.    Eyes: Negative for discharge and redness.   Respiratory: Negative for cough and shortness of breath.    Cardiovascular: Negative for chest pain and leg swelling.   Gastrointestinal:  "Negative for nausea and vomiting.   Musculoskeletal: Negative for joint pain.   Skin: Negative for rash.        + laceration to right middle finger   Neurological: Negative for headaches.          Objective:     /76 (BP Location: Left arm, Patient Position: Sitting, BP Cuff Size: Adult)   Pulse 74   Temp 36.8 °C (98.2 °F) (Temporal)   Resp 16   Ht 1.694 m (5' 6.69\")   Wt 75.4 kg (166 lb 3.2 oz)   SpO2 97%   BMI 26.27 kg/m²      Physical Exam  Constitutional:       General: She is not in acute distress.     Appearance: Normal appearance. She is well-developed.   HENT:      Head: Normocephalic and atraumatic.      Right Ear: External ear normal.      Left Ear: External ear normal.      Nose: Nose normal.   Eyes:      Extraocular Movements: Extraocular movements intact.      Conjunctiva/sclera: Conjunctivae normal.   Cardiovascular:      Rate and Rhythm: Normal rate.   Pulmonary:      Effort: Pulmonary effort is normal.   Musculoskeletal:        Hands:       Cervical back: Normal range of motion and neck supple.      Comments:   Right 3rd Digit:  3 cm linear partial-thickness laceration to the palmar aspect of the right third digit overlying the middle phalanx.  No tenderness palpation.  No swelling.  No surrounding erythema.  No increased warmth.  No discharge/drainage.  No active bleeding.  No visible foreign bodies.  ROM intact -the patient is a full active range of motion of the right third digit  Neurovascular intact distally  Strength 5/5 -flexion/extension of the right third digit against resistance   Skin:     General: Skin is warm and dry.   Neurological:      Mental Status: She is alert and oriented to person, place, and time.            Progress:  Extensively cleaned the patient's laceration with diluted Hibiclens.  Irrigated the wound with saline bullets.  The patient's laceration is is partial-thickness and requires primary closure.  Advised the patient of the risks and benefits of primary " closure.  Informed the patient of the increased risk of infection given the prolonged duration from the initial injury.  The patient verbalized understanding.  Will loosely close the patient's laceration at this time.  Will also place the patient on prophylactic antibiotics given the prolonged duration since the initial injury.    Procedure: Laceration Repair  -Risks including bleeding, nerve damage, infection, and poor cosmetic outcome discussed at length.   -Benefits and alternatives discussed.   -Sterile technique throughout  -Local anesthesia with 1% lidocaine without epi = 3mL  -Closed with #2  5-0 Nylon interrupted sutures with loose wound approximation.  -Polysporin and dressing placed  -Patient tolerated well  -Return to clinic in 7 days for suture removal      The patient's tetanus vaccine is up-to-date per [employer].  The patient received a Tdap in 2018.    Educated the patient on proper wound care.  Advised patient to monitor for secondary signs of infection.    Provided the patient with a finger splint to avoid bending, which may disrupt the laceration.       Assessment/Plan:          1. Laceration of right middle finger without foreign body without damage to nail, initial encounter  - cephALEXin (KEFLEX) 500 MG Cap; Take 1 capsule by mouth 4 times a day for 7 days.  Dispense: 28 capsule; Refill: 0    Differential diagnoses, supportive care, and indications for immediate follow-up discussed with patient.   Instructed to return to clinic or nearest emergency department for any change in condition, further concerns, or worsening of symptoms.    OTC Tylenol or Motrin for fever/discomfort.  Keep laceration clean and dry  Apply Polysporin/Neosporin to the laceration  Cover laceration while at work  Allow wound to be open to the air for at least a portion of the day  Avoid soaking the wound  Monitor for signs of infection  Follow-up with PCP as needed  Return to clinic in 7 days for suture removal   AVS  printed  Return to clinic or go to the ED if symptoms worsen or fail to improve, or if patient should develop worsening/increasing/persistent pain/tenderness to the injured area, swelling, bruising, redness or warmth to the injured area, discharge/drainage from the wound, decreased range of motion, fever/chills, secondary signs of infection, and/or any concerning symptoms.    Discussed plan with the patient, and she agrees to the above.     I personally reviewed prior external notes and test results pertinent to today's visit.  I have independently reviewed and interpreted all diagnostics ordered during this urgent care visit.     Time spent evaluating this patient was at least 30 minutes and includes preparing for visit, obtaining history, exam and evaluation, ordering labs/tests/procedures/medications, independent interpretation, and counseling/education.    Please note that this dictation was created using voice recognition software. I have made every reasonable attempt to correct obvious errors, but I expect that there may be errors of grammar and possibly content that I did not discover before finalizing the note.       This note was electronically signed by Ariela Khalil PA-C

## 2021-04-09 NOTE — PATIENT INSTRUCTIONS
Laceration Care, Adult  A laceration is a cut that may go through all layers of the skin and into the tissue that is right under the skin. Some lacerations heal on their own. Others need to be closed with stitches (sutures), staples, skin adhesive strips, or skin glue. Proper care of a laceration reduces the risk for infection, helps the laceration heal better, and may prevent scarring.  How to care for your laceration  Wash your hands with soap and water before touching your wound or changing your bandage (dressing). If soap and water are not available, use hand .  Keep the wound clean and dry.  If you were given a dressing, you should change it at least once a day, or as told by your health care provider. You should also change it if it becomes wet or dirty.  If sutures or staples were used:  · Keep the wound completely dry for the first 24 hours, or as told by your health care provider. After that time, you may shower or bathe. However, make sure that the wound is not soaked in water until after the sutures or staples have been removed.  · Clean the wound once each day, or as told by your health care provider:  ? Wash the wound with soap and water.  ? Rinse the wound with water to remove all soap.  ? Pat the wound dry with a clean towel. Do not rub the wound.  · After cleaning the wound, apply a thin layer of antibiotic ointment as told by your health care provider. This will help prevent infection and keep the dressing from sticking to the wound.  · Have the sutures or staples removed as told by your health care provider.  If skin adhesive strips were used:  · Do not get the skin adhesive strips wet. You may shower or bathe, but be careful to keep the wound dry.  · If the wound gets wet, pat it dry with a clean towel. Do not rub the wound.  · Skin adhesive strips fall off on their own. You may trim the strips as the wound heals. Do not remove skin adhesive strips that are still stuck to the wound. They  will fall off in time.  If skin glue was used:  · Try to keep the wound dry, but you may briefly wet it in the shower or bath. Do not soak the wound in water, such as by swimming.  · After you have showered or bathed, gently pat the wound dry with a clean towel. Do not rub the wound.  · Do not do any activities that will make you sweat heavily until the skin glue has fallen off on its own.  · Do not apply liquid, cream, or ointment medicine to the wound while the skin glue is in place. Using those may loosen the film before the wound has healed.  · If a dressing is placed over the wound, be careful not to apply tape directly over the skin glue. Doing that may cause the glue to be pulled off before the wound has healed.  · Do not pick at the glue. Skin glue usually remains in place for 5-10 days and then falls off the skin.  General instructions    · Take over-the-counter and prescription medicines only as told by your health care provider.  · If you were prescribed an antibiotic medicine or ointment, take or apply it as told by your health care provider. Do not stop using it even if your condition improves.  · Do not scratch or pick at the wound.  · Check your wound every day for signs of infection. Watch for:  ? Redness, swelling, or pain.  ? Fluid, blood, or pus.  · Raise (elevate) the injured area above the level of your heart while you are sitting or lying down for the first 24-48 hours after the laceration is repaired.  · If directed, put ice on the affected area:  ? Put ice in a plastic bag.  ? Place a towel between your skin and the bag.  ? Leave the ice on for 20 minutes, 2-3 times a day.  · Keep all follow-up visits as told by your health care provider. This is important.  Contact a health care provider if:  · You received a tetanus shot and you have swelling, severe pain, redness, or bleeding at the injection site.  · You have a fever.  · A wound that was closed breaks open.  · You notice a bad smell  coming from your wound or your dressing.  · You notice something coming out of the wound, such as wood or glass.  · Your pain is not controlled with medicine.  · You have increased redness, swelling, or pain at the site of your wound.  · You have fluid, blood, or pus coming from your wound.  · You need to change the dressing often due to fluid, blood, or pus that is draining from the wound.  · You develop a new rash.  · You develop numbness around the wound.  Get help right away if:  · You develop severe swelling around the wound.  · Your pain suddenly increases and is severe.  · You develop painful lumps near the wound or on skin anywhere else on your body.  · You have a red streak going away from your wound.  · The wound is on your hand or foot and you cannot properly move a finger or toe.  · The wound is on your hand or foot, and you notice that your fingers or toes look pale or bluish.  Summary  · A laceration is a cut that may go through all layers of the skin and into the tissue that is right under the skin.  · Some lacerations heal on their own. Others need to be closed with stitches (sutures), staples, skin adhesive strips, or skin glue.  · Proper care of a laceration reduces the risk of infection, helps the laceration heal better, and prevents scarring.  This information is not intended to replace advice given to you by your health care provider. Make sure you discuss any questions you have with your health care provider.  Document Released: 12/18/2006 Document Revised: 02/15/2019 Document Reviewed: 01/07/2019  Elsevier Patient Education © 2020 [employer]

## 2021-04-16 ENCOUNTER — OFFICE VISIT (OUTPATIENT)
Dept: URGENT CARE | Facility: CLINIC | Age: 56
End: 2021-04-16
Payer: COMMERCIAL

## 2021-04-16 VITALS
HEART RATE: 72 BPM | DIASTOLIC BLOOD PRESSURE: 68 MMHG | WEIGHT: 166 LBS | BODY MASS INDEX: 26.06 KG/M2 | TEMPERATURE: 98.2 F | SYSTOLIC BLOOD PRESSURE: 118 MMHG | OXYGEN SATURATION: 99 % | HEIGHT: 67 IN

## 2021-04-16 DIAGNOSIS — Z48.02 VISIT FOR SUTURE REMOVAL: ICD-10-CM

## 2021-04-16 PROCEDURE — 99212 OFFICE O/P EST SF 10 MIN: CPT | Performed by: NURSE PRACTITIONER

## 2021-04-16 ASSESSMENT — ENCOUNTER SYMPTOMS
FEVER: 0
MYALGIAS: 0
CHILLS: 0

## 2021-04-16 NOTE — PROGRESS NOTES
Subjective:      Lacey Ochoa is a 56 y.o. female who presents with Wound Check (suture removal (R) middle finger)            HPI   Recurrent problem.  Patient is a 56-year-old female who presents for wound check and suture removal from her right middle finger.  She had 2 sutures placed.  She denies any complications up to this wound.  She has finished a course of Keflex due to this being a delayed closure.  Patient has no known allergies.  Current Outpatient Medications on File Prior to Visit   Medication Sig Dispense Refill   • cephALEXin (KEFLEX) 500 MG Cap Take 1 capsule by mouth 4 times a day for 7 days. 28 capsule 0   • valacyclovir (VALTREX) 1 GM Tab TAKE 1 TABLET BY MOUTH THREE TIMES A DAY 30 Tab 1   • valacyclovir (VALTREX) 1 GM Tab TAKE 1 TAB BY MOUTH 3 TIMES A DAY. 36 Tab 1   • estrogen, conjugated,-medroxyprogesterone (PREMPRO) 0.45-1.5 MG per tablet Take 1 Tab by mouth every day.       No current facility-administered medications on file prior to visit.     Social History     Socioeconomic History   • Marital status:      Spouse name: Not on file   • Number of children: Not on file   • Years of education: Not on file   • Highest education level: Not on file   Occupational History   • Not on file   Tobacco Use   • Smoking status: Former Smoker     Packs/day: 1.00     Years: 20.00     Pack years: 20.00   • Smokeless tobacco: Never Used   • Tobacco comment: QUIT 4 YEARS AGO   Substance and Sexual Activity   • Alcohol use: Yes     Alcohol/week: 0.0 oz     Comment: 2 GLASSES OF WINE 3 X WEEK   • Drug use: No   • Sexual activity: Not on file   Other Topics Concern   • Not on file   Social History Narrative   • Not on file     Social Determinants of Health     Financial Resource Strain:    • Difficulty of Paying Living Expenses:    Food Insecurity:    • Worried About Running Out of Food in the Last Year:    • Ran Out of Food in the Last Year:    Transportation Needs:    • Lack of  "Transportation (Medical):    • Lack of Transportation (Non-Medical):    Physical Activity:    • Days of Exercise per Week:    • Minutes of Exercise per Session:    Stress:    • Feeling of Stress :    Social Connections:    • Frequency of Communication with Friends and Family:    • Frequency of Social Gatherings with Friends and Family:    • Attends [employer] Services:    • Active Member of Clubs or Organizations:    • Attends Club or Organization Meetings:    • Marital Status:    Intimate Partner Violence:    • Fear of Current or Ex-Partner:    • Emotionally Abused:    • Physically Abused:    • Sexually Abused:      Breast Cancer-related family history is not on file.      Review of Systems   Constitutional: Negative for chills and fever.   Musculoskeletal: Negative for myalgias.   Skin: Negative for itching and rash.          Objective:     /68 (BP Location: Left arm, Patient Position: Sitting, BP Cuff Size: Adult)   Pulse 72   Temp 36.8 °C (98.2 °F) (Temporal)   Ht 1.694 m (5' 6.69\")   Wt 75.3 kg (166 lb)   SpO2 99%   BMI 26.24 kg/m²      Physical Exam  Constitutional:       Appearance: Normal appearance. She is not ill-appearing.   Musculoskeletal:         General: Normal range of motion.   Skin:     General: Skin is warm and dry.      Comments: She has a well-healed incision to this right middle finger.  2 sutures removed without problem.   Neurological:      General: No focal deficit present.      Mental Status: She is alert.   Psychiatric:         Mood and Affect: Mood normal.         Behavior: Behavior normal.                 Assessment/Plan:        1. Visit for suture removal       Follow up as needed.    "

## 2021-06-21 ENCOUNTER — HOSPITAL ENCOUNTER (OUTPATIENT)
Dept: RADIOLOGY | Facility: MEDICAL CENTER | Age: 56
End: 2021-06-21
Attending: INTERNAL MEDICINE
Payer: COMMERCIAL

## 2021-06-21 DIAGNOSIS — Z12.31 VISIT FOR SCREENING MAMMOGRAM: ICD-10-CM

## 2021-06-21 PROCEDURE — 77063 BREAST TOMOSYNTHESIS BI: CPT

## 2022-05-18 ENCOUNTER — OFFICE VISIT (OUTPATIENT)
Dept: INTERNAL MEDICINE | Facility: OTHER | Age: 57
End: 2022-05-18
Payer: COMMERCIAL

## 2022-05-18 VITALS
TEMPERATURE: 99.3 F | HEART RATE: 77 BPM | BODY MASS INDEX: 27.29 KG/M2 | OXYGEN SATURATION: 93 % | SYSTOLIC BLOOD PRESSURE: 118 MMHG | DIASTOLIC BLOOD PRESSURE: 73 MMHG | WEIGHT: 163.8 LBS | HEIGHT: 65 IN

## 2022-05-18 DIAGNOSIS — Z78.9 ALCOHOL USE: ICD-10-CM

## 2022-05-18 DIAGNOSIS — Z12.31 ENCOUNTER FOR SCREENING MAMMOGRAM FOR MALIGNANT NEOPLASM OF BREAST: ICD-10-CM

## 2022-05-18 DIAGNOSIS — Z78.0 POST-MENOPAUSE: ICD-10-CM

## 2022-05-18 PROBLEM — R92.8 ABNORMAL MAMMOGRAM: Status: ACTIVE | Noted: 2020-06-18

## 2022-05-18 PROCEDURE — 99213 OFFICE O/P EST LOW 20 MIN: CPT | Mod: GC | Performed by: STUDENT IN AN ORGANIZED HEALTH CARE EDUCATION/TRAINING PROGRAM

## 2022-05-18 ASSESSMENT — PATIENT HEALTH QUESTIONNAIRE - PHQ9: CLINICAL INTERPRETATION OF PHQ2 SCORE: 0

## 2022-05-18 NOTE — PROGRESS NOTES
"    Established Patient    Patient Care Team:  Wilton Muro M.D. as PCP - General (Internal Medicine)    Lacey Ochoa is a 57 y.o. female who presents today with the following Chief Complaint(s): Follow up for Diagnoses of Post-menopause, Encounter for screening mammogram for malignant neoplasm of breast, and Alcohol use were pertinent to this visit.    HPI:    57-year-old female came for an office visit to [facility] and discuss the following    #Reestablish care  #Screening for breast cancer with mammogram  #Postmenopausal status  #Alcohol use disorder    -Patient is doing well, denies any complaints, has approximately 5-6 alcoholic drinks a week mostly wine, due for mammogram next month  -DEXA scan will be ordered.  -Denies any other complaints.  No problems updated.     ROS:     Denies any new chest pain or shortness of breath.  No changes to urinary or bowel function. See HPI.    No past medical history on file.  Social History     Tobacco Use   • Smoking status: Former Smoker     Packs/day: 1.00     Years: 20.00     Pack years: 20.00   • Smokeless tobacco: Never Used   • Tobacco comment: QUIT 4 YEARS AGO   Vaping Use   • Vaping Use: Never used   Substance Use Topics   • Alcohol use: Yes     Alcohol/week: 0.0 oz     Comment: 2 GLASSES OF WINE 3 X WEEK   • Drug use: No     Current Outpatient Medications   Medication Sig Dispense Refill   • valacyclovir (VALTREX) 1 GM Tab TAKE 1 TAB BY MOUTH 3 TIMES A DAY. 36 Tab 1   • estrogen, conjugated,-medroxyprogesterone (PREMPRO) 0.45-1.5 MG per tablet Take 1 Tablet by mouth every day.     • valacyclovir (VALTREX) 1 GM Tab TAKE 1 TABLET BY MOUTH THREE TIMES A DAY 30 Tab 1     No current facility-administered medications for this visit.     Physical Exam:  /73 (BP Location: Left arm, Patient Position: Sitting, BP Cuff Size: Adult)   Pulse 77   Temp 37.4 °C (99.3 °F) (Temporal)   Ht 1.651 m (5' 5\")   Wt 74.3 kg (163 lb 12.8 oz)  "  SpO2 93%   BMI 27.26 kg/m²   General: Well developed, well nourished female, in no distress.  Eyes: Conjuntiva without any obvious injection or erythema.   Cardiovascular: Heart is regular with no murmur  Lungs: Clear to auscultation bilaterally. No wheezes, rhonci or crackles heard. Respiratory effort is normal.  Abd: Soft, non-tender  Ext: No edema    Assessment and Plan:   1. Post-menopause  -Doing well, denies any recent falls or fractures, high risk for osteoporosis given her prior history of 20-year smoking history and ongoing alcohol use.  -DEXA scan ordered.  - DS-BONE DENSITY STUDY (DEXA); Future  - Lipid Profile; Future    2. Encounter for screening mammogram for malignant neoplasm of breast  -Prior mammogram revealed some fibroglandular density but no radiographic evidence of malignancy  -Scheduled for a follow-up screening mammogram next month    3. Alcohol use  -Is approximately 5-6 drinks per week, mostly wine  -Education and counseling provided regarding adverse effect of alcohol use  -We will follow-up.  - DS-BONE DENSITY STUDY (DEXA); Future  - CBC WITH DIFFERENTIAL; Future  - Comp Metabolic Panel; Future  - Lipid Profile; Future     Return in about 3 months (around 8/18/2022).  Patient Instructions   -Exercise and balanced nutrition  -Take medications as prescribed  -Dexa scan ordered  -Follow-up with PCP as scheduled or return to clinic earlier if any symptoms.

## 2022-05-18 NOTE — PATIENT INSTRUCTIONS
-Exercise and balanced nutrition  -Take medications as prescribed  -Dexa scan ordered  -Follow-up with PCP as scheduled or return to clinic earlier if any symptoms.

## 2022-05-25 ENCOUNTER — HOSPITAL ENCOUNTER (OUTPATIENT)
Dept: LAB | Facility: MEDICAL CENTER | Age: 57
End: 2022-05-25
Attending: STUDENT IN AN ORGANIZED HEALTH CARE EDUCATION/TRAINING PROGRAM
Payer: COMMERCIAL

## 2022-05-25 ENCOUNTER — HOSPITAL ENCOUNTER (OUTPATIENT)
Dept: RADIOLOGY | Facility: MEDICAL CENTER | Age: 57
End: 2022-05-25
Attending: STUDENT IN AN ORGANIZED HEALTH CARE EDUCATION/TRAINING PROGRAM
Payer: COMMERCIAL

## 2022-05-25 DIAGNOSIS — Z78.0 POST-MENOPAUSE: ICD-10-CM

## 2022-05-25 DIAGNOSIS — Z78.9 ALCOHOL USE: ICD-10-CM

## 2022-05-25 LAB
ALBUMIN SERPL BCP-MCNC: 4.5 G/DL (ref 3.2–4.9)
ALBUMIN/GLOB SERPL: 1.9 G/DL
ALP SERPL-CCNC: 65 U/L (ref 30–99)
ALT SERPL-CCNC: 14 U/L (ref 2–50)
ANION GAP SERPL CALC-SCNC: 8 MMOL/L (ref 7–16)
AST SERPL-CCNC: 21 U/L (ref 12–45)
BASOPHILS # BLD AUTO: 0.7 % (ref 0–1.8)
BASOPHILS # BLD: 0.03 K/UL (ref 0–0.12)
BILIRUB SERPL-MCNC: 0.3 MG/DL (ref 0.1–1.5)
BUN SERPL-MCNC: 13 MG/DL (ref 8–22)
CALCIUM SERPL-MCNC: 8.8 MG/DL (ref 8.5–10.5)
CHLORIDE SERPL-SCNC: 105 MMOL/L (ref 96–112)
CHOLEST SERPL-MCNC: 229 MG/DL (ref 100–199)
CO2 SERPL-SCNC: 25 MMOL/L (ref 20–33)
CREAT SERPL-MCNC: 0.63 MG/DL (ref 0.5–1.4)
EOSINOPHIL # BLD AUTO: 0.04 K/UL (ref 0–0.51)
EOSINOPHIL NFR BLD: 0.9 % (ref 0–6.9)
ERYTHROCYTE [DISTWIDTH] IN BLOOD BY AUTOMATED COUNT: 49.6 FL (ref 35.9–50)
GFR SERPLBLD CREATININE-BSD FMLA CKD-EPI: 103 ML/MIN/1.73 M 2
GLOBULIN SER CALC-MCNC: 2.4 G/DL (ref 1.9–3.5)
GLUCOSE SERPL-MCNC: 88 MG/DL (ref 65–99)
HCT VFR BLD AUTO: 40.1 % (ref 37–47)
HDLC SERPL-MCNC: 70 MG/DL
HGB BLD-MCNC: 13.6 G/DL (ref 12–16)
IMM GRANULOCYTES # BLD AUTO: 0.01 K/UL (ref 0–0.11)
IMM GRANULOCYTES NFR BLD AUTO: 0.2 % (ref 0–0.9)
LDLC SERPL CALC-MCNC: 143 MG/DL
LYMPHOCYTES # BLD AUTO: 2.32 K/UL (ref 1–4.8)
LYMPHOCYTES NFR BLD: 52.6 % (ref 22–41)
MCH RBC QN AUTO: 33 PG (ref 27–33)
MCHC RBC AUTO-ENTMCNC: 33.9 G/DL (ref 33.6–35)
MCV RBC AUTO: 97.3 FL (ref 81.4–97.8)
MONOCYTES # BLD AUTO: 0.26 K/UL (ref 0–0.85)
MONOCYTES NFR BLD AUTO: 5.9 % (ref 0–13.4)
NEUTROPHILS # BLD AUTO: 1.75 K/UL (ref 2–7.15)
NEUTROPHILS NFR BLD: 39.7 % (ref 44–72)
NRBC # BLD AUTO: 0 K/UL
NRBC BLD-RTO: 0 /100 WBC
PLATELET # BLD AUTO: 196 K/UL (ref 164–446)
PMV BLD AUTO: 9.2 FL (ref 9–12.9)
POTASSIUM SERPL-SCNC: 4.7 MMOL/L (ref 3.6–5.5)
PROT SERPL-MCNC: 6.9 G/DL (ref 6–8.2)
RBC # BLD AUTO: 4.12 M/UL (ref 4.2–5.4)
SODIUM SERPL-SCNC: 138 MMOL/L (ref 135–145)
TRIGL SERPL-MCNC: 81 MG/DL (ref 0–149)
WBC # BLD AUTO: 4.4 K/UL (ref 4.8–10.8)

## 2022-05-25 PROCEDURE — 36415 COLL VENOUS BLD VENIPUNCTURE: CPT

## 2022-05-25 PROCEDURE — 77080 DXA BONE DENSITY AXIAL: CPT

## 2022-05-25 PROCEDURE — 80061 LIPID PANEL: CPT

## 2022-05-25 PROCEDURE — 85025 COMPLETE CBC W/AUTO DIFF WBC: CPT

## 2022-05-25 PROCEDURE — 80053 COMPREHEN METABOLIC PANEL: CPT

## 2022-06-02 NOTE — RESULT ENCOUNTER NOTE
Please let the patient know that I reviewed her labs which are normal except minimal elevation of cholesterol, no acute concerns or interventions needed, patient has an appointment with me on 06/22, will discuss in detail.

## 2022-06-22 ENCOUNTER — HOSPITAL ENCOUNTER (OUTPATIENT)
Dept: RADIOLOGY | Facility: MEDICAL CENTER | Age: 57
End: 2022-06-22
Attending: SPECIALIST
Payer: COMMERCIAL

## 2022-06-22 DIAGNOSIS — Z12.31 VISIT FOR SCREENING MAMMOGRAM: ICD-10-CM

## 2022-06-22 PROCEDURE — 77063 BREAST TOMOSYNTHESIS BI: CPT

## 2022-08-15 ENCOUNTER — OFFICE VISIT (OUTPATIENT)
Dept: INTERNAL MEDICINE | Facility: OTHER | Age: 57
End: 2022-08-15
Payer: COMMERCIAL

## 2022-08-15 VITALS
WEIGHT: 165 LBS | TEMPERATURE: 99.7 F | OXYGEN SATURATION: 95 % | SYSTOLIC BLOOD PRESSURE: 114 MMHG | HEART RATE: 69 BPM | HEIGHT: 65 IN | BODY MASS INDEX: 27.49 KG/M2 | DIASTOLIC BLOOD PRESSURE: 74 MMHG

## 2022-08-15 DIAGNOSIS — B00.1 HERPES LABIALIS: ICD-10-CM

## 2022-08-15 DIAGNOSIS — E78.5 DYSLIPIDEMIA: ICD-10-CM

## 2022-08-15 DIAGNOSIS — Z78.9 ALCOHOL USE: ICD-10-CM

## 2022-08-15 DIAGNOSIS — I87.2 CHRONIC VENOUS INSUFFICIENCY: ICD-10-CM

## 2022-08-15 DIAGNOSIS — N95.1 MENOPAUSAL VASOMOTOR SYNDROME: ICD-10-CM

## 2022-08-15 DIAGNOSIS — Z23 NEED FOR VACCINATION: ICD-10-CM

## 2022-08-15 DIAGNOSIS — Z11.9 ENCOUNTER FOR SCREENING EXAMINATION FOR INFECTIOUS DISEASE: ICD-10-CM

## 2022-08-15 PROBLEM — H01.113 ALLERGIC CONTACT DERMATITIS OF EYELIDS OF BOTH EYES: Status: ACTIVE | Noted: 2017-03-01

## 2022-08-15 PROBLEM — R92.8 ABNORMAL MAMMOGRAM: Status: RESOLVED | Noted: 2020-06-18 | Resolved: 2022-08-15

## 2022-08-15 PROBLEM — Z00.00 HEALTHCARE MAINTENANCE: Status: ACTIVE | Noted: 2022-08-15

## 2022-08-15 PROBLEM — F10.90 ALCOHOL USE: Status: ACTIVE | Noted: 2022-08-15

## 2022-08-15 PROCEDURE — 99214 OFFICE O/P EST MOD 30 MIN: CPT | Mod: GC

## 2022-08-15 RX ORDER — ESTRADIOL 1 MG/1
1 TABLET ORAL
COMMUNITY
Start: 2022-05-25

## 2022-08-15 RX ORDER — PROGESTERONE 100 MG/1
100 CAPSULE ORAL
COMMUNITY
Start: 2022-05-31

## 2022-08-15 ASSESSMENT — ENCOUNTER SYMPTOMS
DIZZINESS: 0
ABDOMINAL PAIN: 0
CHILLS: 0
BLURRED VISION: 0
COUGH: 0
EYE PAIN: 0
PALPITATIONS: 0
WEIGHT LOSS: 0
SHORTNESS OF BREATH: 0
HEARTBURN: 0
LOSS OF CONSCIOUSNESS: 0
MYALGIAS: 0
TINGLING: 0
BRUISES/BLEEDS EASILY: 0
SORE THROAT: 0
NAUSEA: 0
WHEEZING: 0
HEADACHES: 0
FEVER: 0
SEIZURES: 0
VOMITING: 0
BLOOD IN STOOL: 0
WEAKNESS: 0
EYE REDNESS: 0

## 2022-08-15 ASSESSMENT — FIBROSIS 4 INDEX: FIB4 SCORE: 1.63

## 2022-08-15 NOTE — ASSESSMENT & PLAN NOTE
Patient was having menopausal symptoms of night sweats and mood swings.  Currently on Estrace and Prometrium daily    -Symptoms have been well controlled without any complaints by patient  -Continue current Estrace and Prometrium  -We will reassess upon next visit

## 2022-08-15 NOTE — PROGRESS NOTES
Established Patient    Patient Care Team:  Manny Pereira D.O. as PCP - General (Internal Medicine)    Lacey Ochoa is a 57 y.o. female who presents today with the following Chief Complaint(s): Follow up for Diagnoses of Menopausal vasomotor syndrome, Dyslipidemia, Chronic venous insufficiency, Herpes labialis, Encounter for screening examination for infectious disease, Need for vaccination, and Alcohol use were pertinent to this visit.    HPI:  Patient states she is doing well today, with no complaints currently.  Prior to the visit she was having night sweats and mood swings due to menopause, however symptoms have been well controlled on Estrace and Prometrium.  She also states she rarely rarely gets swelling in her lower extremities after standing at work for prolonged periods.  She also takes Valtrex whenever her herpes labialis recurs.  She states she tries to eat a balanced diet, however has not been very physically active recently due to taking care of her  with Parkinson's disease.    Review of Systems   Constitutional:  Negative for chills, fever, malaise/fatigue and weight loss.   HENT:  Negative for hearing loss, sore throat and tinnitus.    Eyes:  Negative for blurred vision, pain and redness.   Respiratory:  Negative for cough, shortness of breath and wheezing.    Cardiovascular:  Positive for leg swelling (mild and only when standing for prolonged periods). Negative for chest pain and palpitations.   Gastrointestinal:  Negative for abdominal pain, blood in stool, heartburn, nausea and vomiting.   Genitourinary:  Negative for dysuria and hematuria.   Musculoskeletal:  Negative for myalgias.   Skin:  Negative for itching and rash.   Neurological:  Negative for dizziness, tingling, seizures, loss of consciousness, weakness and headaches.   Endo/Heme/Allergies:  Does not bruise/bleed easily.     Past Medical History:   Diagnosis Date    Abnormal mammogram 06/18/2020    Chicken  "pox     Onychomycosis 12/13/2016     Social History     Tobacco Use    Smoking status: Former     Packs/day: 1.00     Years: 20.00     Pack years: 20.00     Types: Cigarettes    Smokeless tobacco: Never    Tobacco comments:     QUIT 4 YEARS AGO   Vaping Use    Vaping Use: Never used   Substance Use Topics    Alcohol use: Yes     Alcohol/week: 0.0 oz     Comment: 2 GLASSES OF WINE 3 X WEEK    Drug use: No     Current Outpatient Medications   Medication Sig Dispense Refill    progesterone (PROMETRIUM) 100 MG Cap Take 100 mg by mouth at bedtime.      estradiol (ESTRACE) 1 MG Tab Take 1 mg by mouth every day.      valacyclovir (VALTREX) 1 GM Tab TAKE 1 TABLET BY MOUTH THREE TIMES A DAY 30 Tab 1     No current facility-administered medications for this visit.       /74 (BP Location: Left arm, Patient Position: Sitting, BP Cuff Size: Adult)   Pulse 69   Temp 37.6 °C (99.7 °F) (Temporal)   Ht 1.651 m (5' 5\")   Wt 74.8 kg (165 lb)   SpO2 95%   BMI 27.46 kg/m²   Physical Exam  Constitutional:       General: She is not in acute distress.     Appearance: She is normal weight.   HENT:      Head: Normocephalic and atraumatic.   Eyes:      Extraocular Movements: Extraocular movements intact.      Conjunctiva/sclera: Conjunctivae normal.   Cardiovascular:      Rate and Rhythm: Normal rate and regular rhythm.      Pulses: Normal pulses.      Heart sounds: Normal heart sounds. No murmur heard.    No gallop.   Pulmonary:      Effort: Pulmonary effort is normal.      Breath sounds: Normal breath sounds.   Abdominal:      General: Abdomen is flat. Bowel sounds are normal.      Palpations: Abdomen is soft.   Musculoskeletal:      Right lower leg: No edema.      Left lower leg: No edema.   Neurological:      General: No focal deficit present.      Mental Status: She is alert and oriented to person, place, and time.      Cranial Nerves: No cranial nerve deficit.      Motor: No weakness.   Psychiatric:         Mood and Affect: " Mood normal.         Behavior: Behavior normal.       Assessment and Plan:     Menopausal vasomotor syndrome  Patient was having menopausal symptoms of night sweats and mood swings.  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  Takes Valtrex for 1 month whenever herpes labialis recurs    -Currently no signs of recurrence  -Valtrex as needed for flareups  -Will continue to monitor    Alcohol use  Patient currently drinks 2 glasses of wine per day at a total of 14 drinks per week    -Was advised of the risks of excessive alcohol use  -Encouraged to slowly decrease amount of alcohol consumed  -We will revisit topic upon next visit    Dyslipidemia  Recent labs from May 2022 demonstrated an elevated total cholesterol of 229 and elevated LDL of 143    -ASCVD score at 1.9% therefore statin not indicated  -Patient was encouraged to decrease amount of alcohol consumption, as it could contribute to dyslipidemia  -Also encouraged to try to obtain more physical activity and continue balanced diet    Chronic venous insufficiency  Patient has a history of CVI, and legs swelled up mildly when standing for prolonged periods    -Was advised to continue to elevate legs and use compression when swelling occurs    Healthcare maintenance  Patient overdue on multiple vaccinations and screenings    -Ordered hepatitis C antibody as a screening measure  -Also recommended patient to obtain shingles and hepatitis B vaccine, which patient was informed can be obtained at the nearest pharmacy      Orders Placed This Encounter    HEP C VIRUS ANTIBODY    progesterone (PROMETRIUM) 100 MG Cap    estradiol (ESTRACE) 1 MG Tab       Return in about 7 months (around 3/14/2023).    Manny Pereira D.O. PGY I  [facility]  [employer]

## 2022-08-15 NOTE — ASSESSMENT & PLAN NOTE
Patient currently drinks 2 glasses of wine per day at a total of 14 drinks per week    -Was advised of the risks of excessive alcohol use  -Encouraged to slowly decrease amount of alcohol consumed  -We will revisit topic upon next visit

## 2022-08-15 NOTE — ASSESSMENT & PLAN NOTE
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  Takes Valtrex for 1 month whenever herpes labialis recurs    -Currently no signs of recurrence  -Valtrex as needed for flareups  -Will continue to monitor

## 2022-08-16 NOTE — ASSESSMENT & PLAN NOTE
Recent labs from May 2022 demonstrated an elevated total cholesterol of 229 and elevated LDL of 143    -ASCVD score at 1.9% therefore statin not indicated  -Patient was encouraged to decrease amount of alcohol consumption, as it could contribute to dyslipidemia  -Also encouraged to try to obtain more physical activity and continue balanced diet

## 2022-08-16 NOTE — ASSESSMENT & PLAN NOTE
Patient has a history of CVI, and legs swelled up mildly when standing for prolonged periods    -Was advised to continue to elevate legs and use compression when swelling occurs

## 2022-08-16 NOTE — ASSESSMENT & PLAN NOTE
Patient overdue on multiple vaccinations and screenings    -Ordered hepatitis C antibody as a screening measure  -Also recommended patient to obtain shingles and hepatitis B vaccine, which patient was informed can be obtained at the nearest pharmacy

## 2022-12-23 NOTE — PATIENT INSTRUCTIONS
Get labs drawn  Stop calcium   Griseofulvin Counseling:  I discussed with the patient the risks of griseofulvin including but not limited to photosensitivity, cytopenia, liver damage, nausea/vomiting and severe allergy.  The patient understands that this medication is best absorbed when taken with a fatty meal (e.g., ice cream or french fries).

## 2023-06-26 ENCOUNTER — HOSPITAL ENCOUNTER (OUTPATIENT)
Dept: RADIOLOGY | Facility: MEDICAL CENTER | Age: 58
End: 2023-06-26
Attending: SPECIALIST
Payer: COMMERCIAL

## 2023-06-26 DIAGNOSIS — Z12.31 VISIT FOR SCREENING MAMMOGRAM: ICD-10-CM

## 2023-06-26 PROCEDURE — 77063 BREAST TOMOSYNTHESIS BI: CPT

## 2024-01-16 ENCOUNTER — OFFICE VISIT (OUTPATIENT)
Dept: URGENT CARE | Facility: CLINIC | Age: 59
End: 2024-01-16
Payer: COMMERCIAL

## 2024-01-16 VITALS
DIASTOLIC BLOOD PRESSURE: 70 MMHG | RESPIRATION RATE: 14 BRPM | HEIGHT: 65 IN | OXYGEN SATURATION: 97 % | TEMPERATURE: 97.5 F | HEART RATE: 54 BPM | BODY MASS INDEX: 25.99 KG/M2 | SYSTOLIC BLOOD PRESSURE: 130 MMHG | WEIGHT: 156 LBS

## 2024-01-16 DIAGNOSIS — H60.503 ACUTE OTITIS EXTERNA OF BOTH EARS, UNSPECIFIED TYPE: ICD-10-CM

## 2024-01-16 DIAGNOSIS — H65.93 FLUID LEVEL BEHIND TYMPANIC MEMBRANE OF BOTH EARS: ICD-10-CM

## 2024-01-16 PROCEDURE — 99213 OFFICE O/P EST LOW 20 MIN: CPT

## 2024-01-16 PROCEDURE — 3075F SYST BP GE 130 - 139MM HG: CPT

## 2024-01-16 PROCEDURE — 3078F DIAST BP <80 MM HG: CPT

## 2024-01-16 RX ORDER — PREDNISONE 10 MG/1
20 TABLET ORAL DAILY
Qty: 10 TABLET | Refills: 0 | Status: SHIPPED | OUTPATIENT
Start: 2024-01-16 | End: 2024-01-21

## 2024-01-16 RX ORDER — NEOMYCIN SULFATE, POLYMYXIN B SULFATE AND HYDROCORTISONE 10; 3.5; 1 MG/ML; MG/ML; [USP'U]/ML
4 SUSPENSION/ DROPS AURICULAR (OTIC) 4 TIMES DAILY
Qty: 16 ML | Refills: 0 | Status: SHIPPED | OUTPATIENT
Start: 2024-01-16 | End: 2024-01-26

## 2024-01-16 ASSESSMENT — FIBROSIS 4 INDEX: FIB4 SCORE: 1.66

## 2024-01-17 NOTE — PROGRESS NOTES
"Chief Complaint   Patient presents with    Otalgia     Feel liquid in both ears pain to touch for 3 days now          Subjective:   HISTORY OF PRESENT ILLNESS: Lacey Ochoa is a 58 y.o. female who presents for bilateral ear pain for 3 days.  She is having difficulty hearing out of her right ear worse than her left.   Patient denies drainage, fever, body aches, chills.    Medications, Allergies, current problem list, Social and Family history reviewed today in Epic.     Objective:     /70 (BP Location: Left arm, Patient Position: Sitting, BP Cuff Size: Small adult)   Pulse (!) 54   Temp 36.4 °C (97.5 °F) (Temporal)   Resp 14   Ht 1.651 m (5' 5\")   Wt 70.8 kg (156 lb)   SpO2 97%     Physical Exam  Vitals reviewed.   Constitutional:       Appearance: Normal appearance.   HENT:      Right Ear: No decreased hearing noted. Swelling and tenderness present. A middle ear effusion is present.      Left Ear: Swelling and tenderness present. A middle ear effusion is present.      Ears:      Comments: She does have moderate amount of swelling noted to the right ear less so in the left ear.  TMs are without erythema or bulging but do appear to have air-fluid levels.     Mouth/Throat:      Mouth: Mucous membranes are moist.   Cardiovascular:      Rate and Rhythm: Normal rate.   Pulmonary:      Effort: Pulmonary effort is normal.   Skin:     General: Skin is warm and dry.   Neurological:      Mental Status: She is alert and oriented to person, place, and time.   Psychiatric:         Mood and Affect: Mood normal.          Assessment/Plan:     Diagnosis and associated orders    I personally reviewed prior external notes and test results pertinent to today's visit.     1. Fluid level behind tympanic membrane of both ears  neomycin-polymyxin-HC (PEDIOTIC HC) 3.5-57847-0 Suspension    predniSONE (DELTASONE) 10 MG Tab      2. Acute otitis externa of both ears, unspecified type  neomycin-polymyxin-HC (PEDIOTIC HC) " 3.5-85809-4 Suspension    predniSONE (DELTASONE) 10 MG Tab            IMPRESSION:  Pt has stable vital signs and no red flag symptoms identified. Exam reveals bilateral erythematous ear canals with mild bilateral middle ear effusions.   Informed pt that symptoms are consistent with acute otitis externa with effusion.  Advised to use eardrops as prescribed.  If she has no improvement in her symptoms she can use the prednisone.    Differential diagnosis discussed. Pt was Educated on red flag symptoms. Pt has been Instructed to return to Urgent Care or nearest Emergency Department if symptoms fail to improve, for any change in condition, further concerns, or new concerning symptoms. Patient states understanding of the plan of care and discharge instructions.  They are discharged in stable condition.         Please note that this dictation was created using voice recognition software. I have made a reasonable attempt to correct obvious errors, but I expect that there are errors of grammar and possibly content that I did not discover before finalizing the note.    This note was electronically signed by LORETTA Brock

## 2024-07-10 ENCOUNTER — HOSPITAL ENCOUNTER (OUTPATIENT)
Dept: RADIOLOGY | Facility: MEDICAL CENTER | Age: 59
End: 2024-07-10
Payer: COMMERCIAL

## 2024-07-10 DIAGNOSIS — Z12.31 VISIT FOR SCREENING MAMMOGRAM: ICD-10-CM

## 2024-07-10 PROCEDURE — 77063 BREAST TOMOSYNTHESIS BI: CPT

## 2025-06-16 ENCOUNTER — TELEPHONE (OUTPATIENT)
Dept: HEALTH INFORMATION MANAGEMENT | Facility: OTHER | Age: 60
End: 2025-06-16
Payer: COMMERCIAL

## 2025-07-01 ENCOUNTER — HOSPITAL ENCOUNTER (OUTPATIENT)
Dept: RADIOLOGY | Facility: MEDICAL CENTER | Age: 60
End: 2025-07-01
Attending: STUDENT IN AN ORGANIZED HEALTH CARE EDUCATION/TRAINING PROGRAM
Payer: COMMERCIAL

## 2025-07-01 DIAGNOSIS — Z87.891 PERSONAL HISTORY OF TOBACCO USE, PRESENTING HAZARDS TO HEALTH: ICD-10-CM

## 2025-07-01 PROCEDURE — 71271 CT THORAX LUNG CANCER SCR C-: CPT

## 2025-07-14 ENCOUNTER — APPOINTMENT (OUTPATIENT)
Dept: URBAN - METROPOLITAN AREA CLINIC 6 | Facility: CLINIC | Age: 60
Setting detail: DERMATOLOGY
End: 2025-07-14

## 2025-07-14 DIAGNOSIS — L73.8 OTHER SPECIFIED FOLLICULAR DISORDERS: ICD-10-CM

## 2025-07-14 DIAGNOSIS — D18.0 HEMANGIOMA: ICD-10-CM

## 2025-07-14 DIAGNOSIS — L91.8 OTHER HYPERTROPHIC DISORDERS OF THE SKIN: ICD-10-CM

## 2025-07-14 DIAGNOSIS — Z71.89 OTHER SPECIFIED COUNSELING: ICD-10-CM

## 2025-07-14 DIAGNOSIS — L81.4 OTHER MELANIN HYPERPIGMENTATION: ICD-10-CM

## 2025-07-14 DIAGNOSIS — D22 MELANOCYTIC NEVI: ICD-10-CM

## 2025-07-14 DIAGNOSIS — L82.1 OTHER SEBORRHEIC KERATOSIS: ICD-10-CM

## 2025-07-14 DIAGNOSIS — L90.5 SCAR CONDITIONS AND FIBROSIS OF SKIN: ICD-10-CM

## 2025-07-14 PROBLEM — D18.01 HEMANGIOMA OF SKIN AND SUBCUTANEOUS TISSUE: Status: ACTIVE | Noted: 2025-07-14

## 2025-07-14 PROBLEM — D22.71 MELANOCYTIC NEVI OF RIGHT LOWER LIMB, INCLUDING HIP: Status: ACTIVE | Noted: 2025-07-14

## 2025-07-14 PROBLEM — D22.72 MELANOCYTIC NEVI OF LEFT LOWER LIMB, INCLUDING HIP: Status: ACTIVE | Noted: 2025-07-14

## 2025-07-14 PROBLEM — D22.5 MELANOCYTIC NEVI OF TRUNK: Status: ACTIVE | Noted: 2025-07-14

## 2025-07-14 PROBLEM — D22.62 MELANOCYTIC NEVI OF LEFT UPPER LIMB, INCLUDING SHOULDER: Status: ACTIVE | Noted: 2025-07-14

## 2025-07-14 PROBLEM — D22.61 MELANOCYTIC NEVI OF RIGHT UPPER LIMB, INCLUDING SHOULDER: Status: ACTIVE | Noted: 2025-07-14

## 2025-07-14 PROCEDURE — ? COUNSELING

## 2025-07-14 PROCEDURE — ? DIAGNOSIS COMMENT

## 2025-07-14 PROCEDURE — ? LIQUID NITROGEN

## 2025-07-14 ASSESSMENT — LOCATION DETAILED DESCRIPTION DERM
LOCATION DETAILED: RIGHT PROXIMAL PRETIBIAL REGION
LOCATION DETAILED: LEFT PROXIMAL PRETIBIAL REGION
LOCATION DETAILED: LEFT INFERIOR FOREHEAD
LOCATION DETAILED: RIGHT VENTRAL PROXIMAL FOREARM
LOCATION DETAILED: LEFT INFRAMAMMARY CREASE (INNER QUADRANT)
LOCATION DETAILED: LEFT MEDIAL BREAST 7-8:00 REGION
LOCATION DETAILED: RIGHT ANTERIOR PROXIMAL UPPER ARM
LOCATION DETAILED: LEFT KNEE
LOCATION DETAILED: RIGHT ANTECUBITAL SKIN
LOCATION DETAILED: LEFT VENTRAL PROXIMAL FOREARM
LOCATION DETAILED: LEFT ANTERIOR DISTAL UPPER ARM
LOCATION DETAILED: LOWER STERNUM
LOCATION DETAILED: LEFT MEDIAL BREAST 6-7:00 REGION
LOCATION DETAILED: RIGHT ANTERIOR DISTAL UPPER ARM
LOCATION DETAILED: LEFT ANTERIOR PROXIMAL UPPER ARM
LOCATION DETAILED: RIGHT KNEE
LOCATION DETAILED: RIGHT ANTERIOR DISTAL THIGH
LOCATION DETAILED: LEFT ANTERIOR DISTAL THIGH
LOCATION DETAILED: EPIGASTRIC SKIN
LOCATION DETAILED: PERIUMBILICAL SKIN

## 2025-07-14 ASSESSMENT — LOCATION SIMPLE DESCRIPTION DERM
LOCATION SIMPLE: LEFT KNEE
LOCATION SIMPLE: LEFT FOREARM
LOCATION SIMPLE: LEFT FOREHEAD
LOCATION SIMPLE: RIGHT PRETIBIAL REGION
LOCATION SIMPLE: LEFT THIGH
LOCATION SIMPLE: CHEST
LOCATION SIMPLE: RIGHT FOREARM
LOCATION SIMPLE: LEFT BREAST
LOCATION SIMPLE: RIGHT KNEE
LOCATION SIMPLE: LEFT UPPER ARM
LOCATION SIMPLE: RIGHT UPPER ARM
LOCATION SIMPLE: RIGHT THIGH
LOCATION SIMPLE: ABDOMEN
LOCATION SIMPLE: LEFT PRETIBIAL REGION

## 2025-07-14 ASSESSMENT — LOCATION ZONE DERM
LOCATION ZONE: LEG
LOCATION ZONE: TRUNK
LOCATION ZONE: FACE
LOCATION ZONE: ARM

## 2025-07-14 NOTE — PROCEDURE: DIAGNOSIS COMMENT
Comment: H/O congenital nevus removed surgically in 2003
Render Risk Assessment In Note?: no
Detail Level: Simple

## 2025-07-14 NOTE — PROCEDURE: LIQUID NITROGEN
Post-Care Instructions: I reviewed with the patient in detail post-care instructions. Patient is to wear sunprotection, and avoid picking at any of the treated lesions. Pt may apply Vaseline to crusted or scabbing areas.
Show Applicator Variable?: Yes
Spray Paint Text: The liquid nitrogen was applied to the skin utilizing a spray paint frosting technique.
Number Of Freeze-Thaw Cycles: 3 freeze-thaw cycles
Medical Necessity Clause: This procedure was medically necessary because the lesions that were treated were:
Medical Necessity Information: It is in your best interest to select a reason for this procedure from the list below. All of these items fulfill various CMS LCD requirements except the new and changing color options.
Consent: The patient's consent was obtained including but not limited to risks of crusting, scabbing, blistering, scarring, darker or lighter pigmentary change, recurrence, incomplete removal and infection.
Add 52 Modifier (Optional): no
Detail Level: Detailed

## 2025-08-04 ENCOUNTER — HOSPITAL ENCOUNTER (OUTPATIENT)
Dept: RADIOLOGY | Facility: MEDICAL CENTER | Age: 60
End: 2025-08-04
Attending: STUDENT IN AN ORGANIZED HEALTH CARE EDUCATION/TRAINING PROGRAM
Payer: COMMERCIAL

## 2025-08-04 VITALS — HEIGHT: 65 IN | BODY MASS INDEX: 25.66 KG/M2 | WEIGHT: 154 LBS

## 2025-08-04 DIAGNOSIS — Z12.31 VISIT FOR SCREENING MAMMOGRAM: ICD-10-CM

## 2025-08-04 PROCEDURE — 77067 SCR MAMMO BI INCL CAD: CPT
